# Patient Record
Sex: FEMALE | Race: WHITE | NOT HISPANIC OR LATINO
[De-identification: names, ages, dates, MRNs, and addresses within clinical notes are randomized per-mention and may not be internally consistent; named-entity substitution may affect disease eponyms.]

---

## 2017-03-28 ENCOUNTER — RECORD ABSTRACTING (OUTPATIENT)
Age: 72
End: 2017-03-28

## 2017-03-28 DIAGNOSIS — Z78.9 OTHER SPECIFIED HEALTH STATUS: ICD-10-CM

## 2017-03-28 DIAGNOSIS — E03.9 HYPOTHYROIDISM, UNSPECIFIED: ICD-10-CM

## 2017-03-28 DIAGNOSIS — N64.52 NIPPLE DISCHARGE: ICD-10-CM

## 2017-03-28 DIAGNOSIS — E66.01 MORBID (SEVERE) OBESITY DUE TO EXCESS CALORIES: ICD-10-CM

## 2017-03-28 DIAGNOSIS — Z87.2 PERSONAL HISTORY OF DISEASES OF THE SKIN AND SUBCUTANEOUS TISSUE: ICD-10-CM

## 2017-03-28 DIAGNOSIS — K22.70 BARRETT'S ESOPHAGUS W/OUT DYSPLASIA: ICD-10-CM

## 2017-03-28 DIAGNOSIS — Z98.890 OTHER SPECIFIED POSTPROCEDURAL STATES: ICD-10-CM

## 2017-03-28 DIAGNOSIS — I10 ESSENTIAL (PRIMARY) HYPERTENSION: ICD-10-CM

## 2017-03-28 DIAGNOSIS — Z12.72 ENCOUNTER FOR SCREENING FOR MALIGNANT NEOPLASM OF VAGINA: ICD-10-CM

## 2017-03-28 DIAGNOSIS — M81.0 AGE-RELATED OSTEOPOROSIS W/OUT CURRENT PATHOLOGICAL FRACTURE: ICD-10-CM

## 2017-03-28 DIAGNOSIS — Z78.0 ASYMPTOMATIC MENOPAUSAL STATE: ICD-10-CM

## 2017-03-28 PROBLEM — Z00.00 ENCOUNTER FOR PREVENTIVE HEALTH EXAMINATION: Status: ACTIVE | Noted: 2017-03-28

## 2017-03-28 RX ORDER — LISINOPRIL 30 MG/1
TABLET ORAL AS DIRECTED
Refills: 0 | Status: ACTIVE | COMMUNITY

## 2017-03-28 RX ORDER — ESOMEPRAZOLE MAGNESIUM 20 MG/1
TABLET ORAL
Refills: 0 | Status: ACTIVE | COMMUNITY

## 2017-03-28 RX ORDER — UBIQUINOL 100 MG
CAPSULE ORAL
Refills: 0 | Status: ACTIVE | COMMUNITY

## 2017-03-28 RX ORDER — LEVOTHYROXINE SODIUM 137 UG/1
TABLET ORAL DAILY
Refills: 0 | Status: ACTIVE | COMMUNITY

## 2017-08-14 ENCOUNTER — APPOINTMENT (OUTPATIENT)
Dept: OBGYN | Facility: CLINIC | Age: 72
End: 2017-08-14

## 2018-08-31 ENCOUNTER — OUTPATIENT (OUTPATIENT)
Dept: OUTPATIENT SERVICES | Facility: HOSPITAL | Age: 73
LOS: 1 days | Discharge: HOME | End: 2018-08-31

## 2018-08-31 ENCOUNTER — RESULT REVIEW (OUTPATIENT)
Age: 73
End: 2018-08-31

## 2018-09-05 DIAGNOSIS — Z01.419 ENCOUNTER FOR GYNECOLOGICAL EXAMINATION (GENERAL) (ROUTINE) WITHOUT ABNORMAL FINDINGS: ICD-10-CM

## 2019-09-20 ENCOUNTER — APPOINTMENT (OUTPATIENT)
Dept: OBGYN | Facility: CLINIC | Age: 74
End: 2019-09-20
Payer: MEDICARE

## 2019-09-20 VITALS
SYSTOLIC BLOOD PRESSURE: 128 MMHG | HEIGHT: 63 IN | WEIGHT: 233 LBS | DIASTOLIC BLOOD PRESSURE: 82 MMHG | BODY MASS INDEX: 41.29 KG/M2

## 2019-09-20 PROCEDURE — 99397 PER PM REEVAL EST PAT 65+ YR: CPT

## 2019-09-20 RX ORDER — CELECOXIB 50 MG/1
CAPSULE ORAL
Refills: 0 | Status: DISCONTINUED | COMMUNITY
End: 2019-09-20

## 2019-09-20 RX ORDER — LISINOPRIL AND HYDROCHLOROTHIAZIDE TABLETS 10; 12.5 MG/1; MG/1
TABLET ORAL
Refills: 0 | Status: DISCONTINUED | COMMUNITY
End: 2019-09-20

## 2019-09-20 NOTE — HISTORY OF PRESENT ILLNESS
[Last Mammogram ___] : Last Mammogram was [unfilled] [Last Pap ___] : Last cervical pap smear was [unfilled] [Last Colonoscopy ___] : Last colonoscopy [unfilled] [de-identified] : up to date [Postmenopausal] : is postmenopausal [Experiencing Menopausal Sxs] : not experiencing menopausal symptoms [Currently In Menopause] : currently in menopause [Sexually Active] : is not sexually active

## 2019-09-20 NOTE — PHYSICAL EXAM
[Acute Distress] : no acute distress [Awake] : awake [Alert] : alert [Mass] : no breast mass [Nipple Discharge] : no nipple discharge [Axillary LAD] : no axillary lymphadenopathy [Tender] : non tender [Soft] : soft [Oriented x3] : oriented to person, place, and time [Labia Majora] : labia major [Labia Minora] : labia minora [Normal] : clitoris [No Tenderness] : no rectal tenderness [No Bleeding] : there was no active vaginal bleeding [Uterine Adnexae] : were not tender and not enlarged [Nl Sphincter Tone] : normal sphincter tone

## 2019-11-19 ENCOUNTER — MESSAGE (OUTPATIENT)
Age: 74
End: 2019-11-19

## 2020-09-25 ENCOUNTER — APPOINTMENT (OUTPATIENT)
Dept: OBGYN | Facility: CLINIC | Age: 75
End: 2020-09-25
Payer: SELF-PAY

## 2020-09-25 VITALS
SYSTOLIC BLOOD PRESSURE: 138 MMHG | DIASTOLIC BLOOD PRESSURE: 88 MMHG | WEIGHT: 237 LBS | BODY MASS INDEX: 41.99 KG/M2 | HEIGHT: 63 IN

## 2020-09-25 DIAGNOSIS — Z01.419 ENCOUNTER FOR GYNECOLOGICAL EXAMINATION (GENERAL) (ROUTINE) W/OUT ABNORMAL FINDINGS: ICD-10-CM

## 2020-09-25 PROCEDURE — 99397 PER PM REEVAL EST PAT 65+ YR: CPT

## 2020-09-25 RX ORDER — BIOTIN 10 MG
TABLET ORAL
Refills: 0 | Status: ACTIVE | COMMUNITY

## 2020-09-25 NOTE — PLAN
[FreeTextEntry1] : Routine gyn exam. PAP/HPV done. If normal, will not repeat in future. Mammo referral given. Primary care with pmd. Pt to rv in 1 year or prn

## 2020-09-25 NOTE — HISTORY OF PRESENT ILLNESS
[FreeTextEntry1] : 75 yo  here for annual visit. Pt having issues with her knee but otherwise denies pmb or urinary issues [Currently In Menopause] : currently in menopause

## 2020-09-25 NOTE — PHYSICAL EXAM
[Examination Of The Breasts] : a normal appearance [No Masses] : no breast masses were palpable [Labia Majora] : normal [Labia Minora] : normal [Normal] : normal [Uterine Adnexae] : normal [No Tenderness] : no tenderness [Nl Sphincter Tone] : normal sphincter tone

## 2020-09-29 LAB — HPV HIGH+LOW RISK DNA PNL CVX: NOT DETECTED

## 2022-07-13 ENCOUNTER — APPOINTMENT (OUTPATIENT)
Dept: ORTHOPEDIC SURGERY | Facility: CLINIC | Age: 77
End: 2022-07-13

## 2022-07-13 PROCEDURE — 99213 OFFICE O/P EST LOW 20 MIN: CPT | Mod: 25

## 2022-07-13 PROCEDURE — 20611 DRAIN/INJ JOINT/BURSA W/US: CPT | Mod: 50

## 2022-07-13 RX ORDER — HYALURONATE SODIUM 10 MG/ML
25 SYRINGE (ML) INTRAARTICULAR
Qty: 15 | Refills: 0 | Status: ACTIVE | COMMUNITY
Start: 2022-06-20

## 2022-07-13 NOTE — DISCUSSION/SUMMARY
[de-identified] :   Today I recommended viscous 3 injection for each knee.  Both knees were injected with Visco 3.  She will use Tylenol and pain if she has any soreness after the injections.  We will see her in a week for further evaluation.

## 2022-07-13 NOTE — PHYSICAL EXAM
[Bilateral] : knee bilaterally [NL (0)] : extension 0 degrees [] : non-antalgic [FreeTextEntry9] : Range of motion assessed with the patient sitting on the exam table. [TWNoteComboBox7] : flexion 80 degrees

## 2022-07-13 NOTE — PROCEDURE
[Large Joint Injection] : Large joint injection [Bilateral] : bilaterally of the [Knee] : knee [Visco-3] : Visco-3 [#1] : series #1 [All ultrasound images have been permanently captured and stored accordingly in our picture archiving and communication system] : All ultrasound images have been permanently captured and stored accordingly in our picture archiving and communication system [Visualization of the needle and placement of injection was performed without complication] : visualization of the needle and placement of injection was performed without complication

## 2022-07-13 NOTE — HISTORY OF PRESENT ILLNESS
[de-identified] : The patient is a 76-year-old female here for subsequent re-evaluation about knee osteoarthritis.  She is here for Visco 3 injection for each knee.  She was very upset she waited so long for her appointment.

## 2022-07-20 ENCOUNTER — APPOINTMENT (OUTPATIENT)
Dept: ORTHOPEDIC SURGERY | Facility: CLINIC | Age: 77
End: 2022-07-20
Payer: MEDICARE

## 2022-07-20 PROCEDURE — 20611 DRAIN/INJ JOINT/BURSA W/US: CPT | Mod: 50

## 2022-07-20 NOTE — DISCUSSION/SUMMARY
[de-identified] :   Today I recommended a visco 3 injection for each knee.  Both knees were injected with Visco 3.  She will use Tylenol and pain if she has any soreness after the injections.  We will see her in a week for further evaluation.  \par \par Supervising physician

## 2022-07-20 NOTE — HISTORY OF PRESENT ILLNESS
[de-identified] : The patient is a 76-year-old female here for subsequent re-evaluation about knee osteoarthritis.  She is here for Visco 3 injection for each knee.  She was very upset she waited so long for her appointment.

## 2022-07-20 NOTE — PROCEDURE
[Large Joint Injection] : Large joint injection [Bilateral] : bilaterally of the [Knee] : knee [Visco-3] : Visco-3 [#2] : series #2 [All ultrasound images have been permanently captured and stored accordingly in our picture archiving and communication system] : All ultrasound images have been permanently captured and stored accordingly in our picture archiving and communication system [Visualization of the needle and placement of injection was performed without complication] : visualization of the needle and placement of injection was performed without complication

## 2022-07-27 ENCOUNTER — APPOINTMENT (OUTPATIENT)
Dept: ORTHOPEDIC SURGERY | Facility: CLINIC | Age: 77
End: 2022-07-27
Payer: MEDICARE

## 2022-07-27 PROCEDURE — 20611 DRAIN/INJ JOINT/BURSA W/US: CPT | Mod: 50

## 2022-07-27 NOTE — DISCUSSION/SUMMARY
[de-identified] :   Today I recommended a visco 3 injection for each knee.  Both knees were injected with Visco 3.  She will use Tylenol and pain if she has any soreness after the injections.  We will see her in 6 months for further evaluation, this was the 3rd injection of Visco 3 for each knee.\par \par \par   Supervising physician: Dr. Andres

## 2022-07-27 NOTE — PROCEDURE
[Large Joint Injection] : Large joint injection [Bilateral] : bilaterally of the [Knee] : knee [Visco-3] : Visco-3 [#3] : series #3 [All ultrasound images have been permanently captured and stored accordingly in our picture archiving and communication system] : All ultrasound images have been permanently captured and stored accordingly in our picture archiving and communication system [Visualization of the needle and placement of injection was performed without complication] : visualization of the needle and placement of injection was performed without complication

## 2022-07-27 NOTE — HISTORY OF PRESENT ILLNESS
[de-identified] : The patient is a 76-year-old female here for subsequent re-evaluation about knee osteoarthritis.  She is here for Visco 3 injection for each knee.  She was very upset she waited so long for her appointment.

## 2022-10-27 ENCOUNTER — OUTPATIENT (OUTPATIENT)
Dept: OUTPATIENT SERVICES | Facility: HOSPITAL | Age: 77
LOS: 1 days | Discharge: HOME | End: 2022-10-27

## 2022-10-27 VITALS
SYSTOLIC BLOOD PRESSURE: 135 MMHG | RESPIRATION RATE: 17 BRPM | DIASTOLIC BLOOD PRESSURE: 65 MMHG | WEIGHT: 229.94 LBS | OXYGEN SATURATION: 95 % | HEART RATE: 89 BPM | HEIGHT: 64 IN | TEMPERATURE: 96 F

## 2022-10-27 VITALS
SYSTOLIC BLOOD PRESSURE: 102 MMHG | OXYGEN SATURATION: 95 % | HEART RATE: 84 BPM | RESPIRATION RATE: 18 BRPM | DIASTOLIC BLOOD PRESSURE: 58 MMHG

## 2022-10-27 DIAGNOSIS — Z98.890 OTHER SPECIFIED POSTPROCEDURAL STATES: Chronic | ICD-10-CM

## 2022-10-27 NOTE — ASU DISCHARGE PLAN (ADULT/PEDIATRIC) - NS MD DC FALL RISK RISK
For information on Fall & Injury Prevention, visit: [email or web address] OR  [email or web address] OR  https://www.cdc.gov/steadi/patient.html

## 2022-10-27 NOTE — ASU PATIENT PROFILE, ADULT - FALL HARM RISK - UNIVERSAL INTERVENTIONS
Bed in lowest position, wheels locked, appropriate side rails in place/Call bell, personal items and telephone in reach/Instruct patient to call for assistance before getting out of bed or chair/Non-slip footwear when patient is out of bed/[place] to call system/Physically safe environment - no spills, clutter or unnecessary equipment/Purposeful Proactive Rounding/Room/bathroom lighting operational, light cord in reach

## 2022-11-01 DIAGNOSIS — I10 ESSENTIAL (PRIMARY) HYPERTENSION: ICD-10-CM

## 2022-11-01 DIAGNOSIS — Z88.0 ALLERGY STATUS TO PENICILLIN: ICD-10-CM

## 2022-11-01 DIAGNOSIS — H26.8 OTHER SPECIFIED CATARACT: ICD-10-CM

## 2022-11-01 DIAGNOSIS — E03.9 HYPOTHYROIDISM, UNSPECIFIED: ICD-10-CM

## 2022-11-17 ENCOUNTER — OUTPATIENT (OUTPATIENT)
Dept: OUTPATIENT SERVICES | Facility: HOSPITAL | Age: 77
LOS: 1 days | Discharge: HOME | End: 2022-11-17

## 2022-11-17 VITALS — RESPIRATION RATE: 17 BRPM | SYSTOLIC BLOOD PRESSURE: 119 MMHG | HEART RATE: 66 BPM | DIASTOLIC BLOOD PRESSURE: 56 MMHG

## 2022-11-17 VITALS — WEIGHT: 229.94 LBS | HEIGHT: 64 IN

## 2022-11-17 DIAGNOSIS — Z98.890 OTHER SPECIFIED POSTPROCEDURAL STATES: Chronic | ICD-10-CM

## 2022-11-17 RX ORDER — ESOMEPRAZOLE MAGNESIUM 40 MG/1
1 CAPSULE, DELAYED RELEASE ORAL
Qty: 0 | Refills: 0 | DISCHARGE

## 2022-11-17 RX ORDER — LEVOTHYROXINE SODIUM 125 MCG
1 TABLET ORAL
Qty: 0 | Refills: 0 | DISCHARGE

## 2022-11-17 RX ORDER — LISINOPRIL/HYDROCHLOROTHIAZIDE 10-12.5 MG
1 TABLET ORAL
Qty: 0 | Refills: 0 | DISCHARGE

## 2022-11-17 NOTE — PRE-ANESTHESIA EVALUATION ADULT - NSANTHOSAYNRD_GEN_A_CORE
denies/No. KELLY screening performed.  STOP BANG Legend: 0-2 = LOW Risk; 3-4 = INTERMEDIATE Risk; 5-8 = HIGH Risk

## 2022-11-17 NOTE — ASU PATIENT PROFILE, ADULT - FALL HARM RISK - UNIVERSAL INTERVENTIONS
Bed in lowest position, wheels locked, appropriate side rails in place/Call bell, personal items and telephone in reach/Instruct patient to call for assistance before getting out of bed or chair/Non-slip footwear when patient is out of bed/[place] to call system/Physically safe environment - no spills, clutter or unnecessary equipment/Purposeful Proactive Rounding/Room/bathroom lighting operational, light cord in reach

## 2022-11-22 DIAGNOSIS — H25.091 OTHER AGE-RELATED INCIPIENT CATARACT, RIGHT EYE: ICD-10-CM

## 2022-11-22 DIAGNOSIS — I10 ESSENTIAL (PRIMARY) HYPERTENSION: ICD-10-CM

## 2022-11-22 DIAGNOSIS — Z88.0 ALLERGY STATUS TO PENICILLIN: ICD-10-CM

## 2022-11-22 DIAGNOSIS — E03.9 HYPOTHYROIDISM, UNSPECIFIED: ICD-10-CM

## 2023-01-30 ENCOUNTER — APPOINTMENT (OUTPATIENT)
Dept: ORTHOPEDIC SURGERY | Facility: CLINIC | Age: 78
End: 2023-01-30
Payer: MEDICARE

## 2023-01-30 PROCEDURE — 99213 OFFICE O/P EST LOW 20 MIN: CPT

## 2023-01-30 NOTE — PHYSICAL EXAM
[Bilateral] : knee bilaterally [NL (0)] : extension 0 degrees [] : ambulation with cane [TWNoteComboBox7] : flexion 100 degrees

## 2023-01-30 NOTE — DISCUSSION/SUMMARY
[de-identified] : At this point her knee is doing very well.  If she experiences any pain she will call me and we could send authorization for repeat Visco 3 injections.  If not I will see her on a as needed basis for each knee.\par \par Supervising physician:  Dr. Andres

## 2023-01-30 NOTE — HISTORY OF PRESENT ILLNESS
[de-identified] :  The patient is a 77-year-old female here for subsequent re-evaluation of both knees.  She is status post Visco 3 injections in each knee, the last injection was on 01/27/2022.  Her knee is doing very well, she is not having any pain in the knees.  She is happy with how her knees feel.

## 2023-07-09 ENCOUNTER — FORM ENCOUNTER (OUTPATIENT)
Age: 78
End: 2023-07-09

## 2023-07-26 NOTE — CHIEF COMPLAINT
Attending Physician Note    I saw and evaluated the patient, performing the key elements of the service. I discussed the findings, assessment and plan with the resident and agree with the resident's findings and plan as documented in the resident's note. GC modifier added. Brief summary:  AWV - preventive care reviewed and discussed  HTN, uncontrolled - pt encouraged to treat this, rationale explained. She agreed to amlodipine 5mg daily. Short  term follow up. PAD - rec statin at some point, but priority is to decrease BP. [Annual Visit] : annual visit

## 2023-08-07 PROBLEM — K22.70 BARRETT'S ESOPHAGUS WITHOUT DYSPLASIA: Chronic | Status: ACTIVE | Noted: 2022-10-27

## 2023-08-07 PROBLEM — E03.9 HYPOTHYROIDISM, UNSPECIFIED: Chronic | Status: ACTIVE | Noted: 2022-10-27

## 2023-08-07 PROBLEM — I10 ESSENTIAL (PRIMARY) HYPERTENSION: Chronic | Status: ACTIVE | Noted: 2022-10-27

## 2023-08-10 ENCOUNTER — APPOINTMENT (OUTPATIENT)
Dept: ORTHOPEDIC SURGERY | Facility: CLINIC | Age: 78
End: 2023-08-10
Payer: MEDICARE

## 2023-08-10 PROCEDURE — 99213 OFFICE O/P EST LOW 20 MIN: CPT | Mod: 25

## 2023-08-10 PROCEDURE — 20610 DRAIN/INJ JOINT/BURSA W/O US: CPT | Mod: RT

## 2023-08-10 NOTE — HISTORY OF PRESENT ILLNESS
[de-identified] : 77-year-old female comes in today for bilateral Visco 3 injections.  She has had injections in the past with relief.

## 2023-08-10 NOTE — ASSESSMENT
[FreeTextEntry1] : Today in the office under sterile conditions I injected the right left knee lateral approach with Visco 3.  Patient tolerated the procedures well.  Puncture sites were covered with a Band-Aid.  Icing if she has any soreness.  She will follow-up week for her second bilateral Visco 3 injections.

## 2023-08-10 NOTE — IMAGING
[de-identified] : On examination of the right and left knee no significant swelling, no ecchymosis, no erythema.  Skin is intact.  She is ambulating with a cane.

## 2023-08-17 ENCOUNTER — APPOINTMENT (OUTPATIENT)
Dept: ORTHOPEDIC SURGERY | Facility: CLINIC | Age: 78
End: 2023-08-17
Payer: MEDICARE

## 2023-08-17 PROCEDURE — 99213 OFFICE O/P EST LOW 20 MIN: CPT | Mod: 25

## 2023-08-17 PROCEDURE — 20610 DRAIN/INJ JOINT/BURSA W/O US: CPT | Mod: RT

## 2023-08-17 NOTE — ASSESSMENT
[FreeTextEntry1] : Today in the office under sterile conditions I injected the right and left knee with Visco 3.  Patient tolerated the procedures well.  Puncture sites were covered the Band-Aid.  Icing if she has any soreness.  Follow-up next week for third and final Visco 3 injection.

## 2023-08-17 NOTE — HISTORY OF PRESENT ILLNESS
[de-identified] : 77-year-old female comes in today for her second bilateral Visco 3 injections.  She did well with the first injections.  No complaints.

## 2023-08-17 NOTE — IMAGING
[de-identified] : On examination of the right and left knee no swelling, no ecchymosis, no erythema.  Skin is intact.  Calves are soft.  She is ambulating well.

## 2023-08-24 ENCOUNTER — APPOINTMENT (OUTPATIENT)
Dept: ORTHOPEDIC SURGERY | Facility: CLINIC | Age: 78
End: 2023-08-24
Payer: MEDICARE

## 2023-08-24 PROCEDURE — 20610 DRAIN/INJ JOINT/BURSA W/O US: CPT | Mod: LT

## 2023-08-24 PROCEDURE — 99213 OFFICE O/P EST LOW 20 MIN: CPT | Mod: 25

## 2023-08-24 NOTE — IMAGING
[de-identified] : Examination of the right and left knee no significant swelling.  Small area of ecchymosis lateral site left knee.  No tenderness.  Calf is soft.

## 2023-08-24 NOTE — HISTORY OF PRESENT ILLNESS
[de-identified] : 77-year-old female comes in today for third and final bilateral Visco 3 injections.  She is doing well.

## 2023-08-24 NOTE — ASSESSMENT
[FreeTextEntry1] : Today in the office under sterile conditions I injected the right  and left knee lateral approach with Visco 3.  Patient tolerated the procedures well.  Puncture sites covered with Band-Aid.  Icing if she has any soreness.  She will give us a call as needed

## 2024-06-10 ENCOUNTER — APPOINTMENT (OUTPATIENT)
Dept: ORTHOPEDIC SURGERY | Facility: CLINIC | Age: 79
End: 2024-06-10
Payer: MEDICARE

## 2024-06-10 DIAGNOSIS — M17.0 BILATERAL PRIMARY OSTEOARTHRITIS OF KNEE: ICD-10-CM

## 2024-06-10 PROCEDURE — 99213 OFFICE O/P EST LOW 20 MIN: CPT

## 2024-06-10 NOTE — HISTORY OF PRESENT ILLNESS
[de-identified] : 70-year-old female comes in today for follow-up bilateral knee osteoarthritis.  Had bilateral Visco 3 injections with relief August 2023.  No new complaints.  Takes Aleve occasionally.  She is interested in repeating injections.  Pain with stairs and walking.  Ambulates with a cane

## 2024-06-10 NOTE — IMAGING
[de-identified] : Examination good motion to knee flexion and extension.  No palpable tenderness.  Calves are soft nontender.  Ambulating with cane.

## 2024-06-10 NOTE — ASSESSMENT
[FreeTextEntry1] : Today we will put in request bilateral Visco 3 injections.  We will see her once we receive the injections.

## 2024-07-22 ENCOUNTER — APPOINTMENT (OUTPATIENT)
Dept: ORTHOPEDIC SURGERY | Facility: CLINIC | Age: 79
End: 2024-07-22
Payer: MEDICARE

## 2024-07-22 PROCEDURE — 20610 DRAIN/INJ JOINT/BURSA W/O US: CPT | Mod: 50

## 2024-07-22 PROCEDURE — 99213 OFFICE O/P EST LOW 20 MIN: CPT | Mod: 25

## 2024-07-22 NOTE — ASSESSMENT
[FreeTextEntry1] : Today in the office in sterile conditions I injected the right and left knee with Visco 3.  Patient tolerated the procedures well.  Puncture sites were covered with Band-Aid.  She did have some bleeding at the left knee injection site compression was done.  She will follow-up next week for her second bilateral Visco 3 injections.

## 2024-07-22 NOTE — IMAGING
[de-identified] : On examination of the right and left knee no obvious swelling, no ecchymosis, no erythema.  Skin is intact.  Slight restriction to range of motion.  Calves are soft nontender.

## 2024-07-22 NOTE — HISTORY OF PRESENT ILLNESS
[de-identified] : 78-year-old female comes in today for bilateral Visco 3 injections.  She has had the injections in the past with relief.  Last given August 2023.

## 2024-07-29 ENCOUNTER — APPOINTMENT (OUTPATIENT)
Dept: ORTHOPEDIC SURGERY | Facility: CLINIC | Age: 79
End: 2024-07-29
Payer: MEDICARE

## 2024-07-29 PROCEDURE — 20610 DRAIN/INJ JOINT/BURSA W/O US: CPT | Mod: 50

## 2024-07-29 PROCEDURE — 99213 OFFICE O/P EST LOW 20 MIN: CPT | Mod: 25

## 2024-07-29 NOTE — ASSESSMENT
[FreeTextEntry1] : Today in the office in the cell conditions I injected the left and right knee lateral approach with physical 3.  Patient tolerated the procedures well.  Puncture sites covered with Band-Aid.  Icing if she has any soreness.  Follow-up next week for third and final bilateral Visco 3 injection

## 2024-07-29 NOTE — IMAGING
[de-identified] : On examination of the left and right knee mild swelling, small area of ecchymosis lateral left knee from prior injection where she did have some bleeding.  Not bothering her.  Calves are soft nontender

## 2024-07-29 NOTE — HISTORY OF PRESENT ILLNESS
[de-identified] : 78-year-old female comes in today for second bilateral Visco 3 injection.  Doing well.  Ambulating with a cane.

## 2024-08-05 ENCOUNTER — APPOINTMENT (OUTPATIENT)
Dept: ORTHOPEDIC SURGERY | Facility: CLINIC | Age: 79
End: 2024-08-05

## 2024-08-05 PROCEDURE — 20610 DRAIN/INJ JOINT/BURSA W/O US: CPT | Mod: 50

## 2024-08-05 PROCEDURE — 99213 OFFICE O/P EST LOW 20 MIN: CPT | Mod: 25

## 2024-08-05 NOTE — ASSESSMENT
[FreeTextEntry1] : Today in the office under sterile conditions I injected the right and left knee lateral approach with visco-3.  Patient tolerated the procedures well.  Puncture sites covered with Band-Aid.  Icing if she has any soreness.  She will give us a call as needed for follow-up.

## 2024-08-05 NOTE — HISTORY OF PRESENT ILLNESS
[de-identified] : 78-year-old female comes in today for her third and final bilateral Visco 3 injections.  Patient ambulates with a cane.